# Patient Record
Sex: FEMALE | Race: WHITE
[De-identification: names, ages, dates, MRNs, and addresses within clinical notes are randomized per-mention and may not be internally consistent; named-entity substitution may affect disease eponyms.]

---

## 2020-02-24 ENCOUNTER — HOSPITAL ENCOUNTER (OUTPATIENT)
Dept: HOSPITAL 92 - SCSMRI | Age: 40
Discharge: HOME | End: 2020-02-24
Attending: FAMILY MEDICINE
Payer: COMMERCIAL

## 2020-02-24 DIAGNOSIS — M51.27: ICD-10-CM

## 2020-02-24 DIAGNOSIS — M48.07: ICD-10-CM

## 2020-02-24 DIAGNOSIS — G54.8: ICD-10-CM

## 2020-02-24 DIAGNOSIS — M48.061: ICD-10-CM

## 2020-02-24 DIAGNOSIS — R19.00: ICD-10-CM

## 2020-02-24 DIAGNOSIS — M51.26: ICD-10-CM

## 2020-02-24 DIAGNOSIS — M54.5: Primary | ICD-10-CM

## 2020-02-24 PROCEDURE — 72158 MRI LUMBAR SPINE W/O & W/DYE: CPT

## 2020-02-24 NOTE — MRI
MR the lumbar spine with and without contrast



INDICATION: Chronic low back pain



COMPARISON: None.



TECHNIQUE: Multiplanar multisequence MR images were obtained of lumbar spine with and without IV cont
rast.



Contrast: 17 cc of MultiHance.



FINDINGS:



Bone marrow: Normal.





Distal spinal cord and conus: Normal.  Conus is seen to terminate at the L1 level.



Visualized retroperitoneum and paraspinal soft tissues: Normal. No lymphadenopathy demonstrated.



Vertebral levels:



L5-S1: There is a large right paracentral cephalad extending disc extrusion measuring 1.8 x 1.7 cm in
 its greatest craniocaudad and mediolateral dimensions causing severe narrowing of the right

lateral recess with contact and slight posterior displacement of the traversing right S1 nerve root. 
This is superimposed on a broad-based disc bulge which along with loss of disc space height and

facet hypertrophy induces mild right neural foraminal narrowing.



L4-5: Broad-based disc bulge with a small superimposed central disc protrusion causes mild ventral ef
facement of the subarachnoid space and mild narrowing of the lateral recess bilaterally. The facet

hypertrophy and along with the disc degenerative disease induces mild left neural foraminal narrowing
.



L3-4: No appreciable central canal or neuroforaminal narrowing.



L2-3: No appreciable central canal or neuroforaminal narrowing.



L1-L2: No appreciable central canal or neuroforaminal narrowing.



T12-L1: No appreciable central canal or neuroforaminal narrowing.



Postcontrast series: No abnormal enhancement demonstrated.





IMPRESSION:

1. Large right paracentral, cephalad extending, disc extrusion at L5-S1 inducing severe narrowing of 
the right lateral recess with contact and posterior displacement of the traversing right S1 nerve

root.

2. Small superimposed central disc protrusion and broad-based disc bulge at L4-5 inducing mild latera
l recess narrowing.

3. Mild right L5-S1 and mild left L4-5 neural foraminal narrowing.



Reported By: Yovani Noguera 

Electronically Signed:  2/24/2020 3:24 PM

## 2020-03-03 ENCOUNTER — HOSPITAL ENCOUNTER (OUTPATIENT)
Dept: HOSPITAL 92 - LABBT | Age: 40
Discharge: HOME | End: 2020-03-03
Attending: SPECIALIST
Payer: COMMERCIAL

## 2020-03-03 DIAGNOSIS — D21.9: ICD-10-CM

## 2020-03-03 DIAGNOSIS — Z01.812: Primary | ICD-10-CM

## 2020-03-03 LAB
ANION GAP SERPL CALC-SCNC: 11 MMOL/L (ref 10–20)
BASOPHILS # BLD AUTO: 0 THOU/UL (ref 0–0.2)
BASOPHILS NFR BLD AUTO: 0.3 % (ref 0–1)
BUN SERPL-MCNC: 12 MG/DL (ref 7–18.7)
CALCIUM SERPL-MCNC: 9.1 MG/DL (ref 7.8–10.44)
CHLORIDE SERPL-SCNC: 106 MMOL/L (ref 98–107)
CO2 SERPL-SCNC: 26 MMOL/L (ref 22–29)
CREAT CL PREDICTED SERPL C-G-VRATE: 0 ML/MIN (ref 70–130)
EOSINOPHIL # BLD AUTO: 0.1 THOU/UL (ref 0–0.7)
EOSINOPHIL NFR BLD AUTO: 1.5 % (ref 0–10)
GLUCOSE SERPL-MCNC: 86 MG/DL (ref 70–105)
HGB BLD-MCNC: 13.4 G/DL (ref 12–16)
LYMPHOCYTES # BLD: 2.7 THOU/UL (ref 1.2–3.4)
LYMPHOCYTES NFR BLD AUTO: 40.7 % (ref 21–51)
MCH RBC QN AUTO: 29.6 PG (ref 27–31)
MCV RBC AUTO: 85.4 FL (ref 78–98)
MONOCYTES # BLD AUTO: 0.4 THOU/UL (ref 0.11–0.59)
MONOCYTES NFR BLD AUTO: 6.7 % (ref 0–10)
NEUTROPHILS # BLD AUTO: 3.3 THOU/UL (ref 1.4–6.5)
NEUTROPHILS NFR BLD AUTO: 50.8 % (ref 42–75)
PLATELET # BLD AUTO: 286 THOU/UL (ref 130–400)
POTASSIUM SERPL-SCNC: 3.9 MMOL/L (ref 3.5–5.1)
PREGS CONTROL BACKGROUND?: (no result)
PREGS CONTROL BAR APPEAR?: YES
RBC # BLD AUTO: 4.52 MILL/UL (ref 4.2–5.4)
SODIUM SERPL-SCNC: 139 MMOL/L (ref 136–145)
WBC # BLD AUTO: 6.6 THOU/UL (ref 4.8–10.8)

## 2020-03-03 PROCEDURE — 80048 BASIC METABOLIC PNL TOTAL CA: CPT

## 2020-03-03 PROCEDURE — 85025 COMPLETE CBC W/AUTO DIFF WBC: CPT

## 2020-03-03 PROCEDURE — 84703 CHORIONIC GONADOTROPIN ASSAY: CPT

## 2020-03-05 ENCOUNTER — HOSPITAL ENCOUNTER (OUTPATIENT)
Dept: HOSPITAL 92 - SDC | Age: 40
Discharge: HOME | End: 2020-03-05
Attending: SPECIALIST
Payer: COMMERCIAL

## 2020-03-05 VITALS — BODY MASS INDEX: 25 KG/M2

## 2020-03-05 DIAGNOSIS — Z88.2: ICD-10-CM

## 2020-03-05 DIAGNOSIS — D17.1: Primary | ICD-10-CM

## 2020-03-05 PROCEDURE — 0KBG0ZZ EXCISION OF LEFT TRUNK MUSCLE, OPEN APPROACH: ICD-10-PCS | Performed by: SPECIALIST

## 2020-03-05 PROCEDURE — 88304 TISSUE EXAM BY PATHOLOGIST: CPT

## 2020-03-05 PROCEDURE — S0020 INJECTION, BUPIVICAINE HYDRO: HCPCS

## 2020-03-06 NOTE — OP
DATE OF PROCEDURE:  03/05/2020



PREOPERATIVE DIAGNOSIS:  6 cm left flank intramuscular lipoma.



POSTOPERATIVE DIAGNOSIS:  6 cm left flank intramuscular lipoma.



PROCEDURE PERFORMED:  Excision of a 6 cm left flank submuscular lipoma.



ANESTHESIA:  General endotracheal.



INDICATIONS FOR PROCEDURE:  The patient is a 39-year-old white female.  She presents

with a persistent and bothersome visible and palpable mass on the left flank.

Ultrasound shows that this appears to be below the level of the superficial muscular

layer and she is taken to the operating room at this time for excision. 



DESCRIPTION OF PROCEDURE:  Informed consent was obtained.  The patient was taken to

the operating room, where general endotracheal anesthesia was obtained with the

patient in supine position.  She was rolled over to right lateral decubitus position

using a bean bag device.  The table was flexed to give optimal exposure of this

mass.  The patient was prepped with ChloraPrep and draped in sterile fashion.  I

utilized the ultrasound to definitively identify the location of the mass and marked

its location on the skin. 



Local anesthetic was infiltrated using a mixture of 0.25% Marcaine and 1% lidocaine

with epinephrine.  Skin incision was created.  Dissection was carried through skin

and subcutaneous tissue.  The superficial muscle was identified and incised.  Just

below this was an easily visible and palpable mass consistent with a lipoma.  This

was carefully dissected circumferentially and removed intact. 



Meticulous hemostasis was obtained with electrocautery.  Additional local anesthetic

was infiltrated.  The wound was then closed in layers using interrupted sutures of

3-0 

Vicryl to close the dead space and another of running suture of 3-0 Monocryl.

Finally, the skin edges were approximated with a running subcuticular suture of 4-0

Monocryl. 

Dermabond was placed externally.  There were no complications.  The patient

tolerated the procedure well and was taken to recovery room in stable condition. 







Job ID:  [id number]

## 2025-01-31 ENCOUNTER — HOSPITAL ENCOUNTER (OUTPATIENT)
Dept: HOSPITAL 92 - BICRAD | Age: 45
Discharge: HOME | End: 2025-01-31
Attending: FAMILY MEDICINE
Payer: COMMERCIAL

## 2025-01-31 DIAGNOSIS — R05.9: Primary | ICD-10-CM

## 2025-01-31 PROCEDURE — 71046 X-RAY EXAM CHEST 2 VIEWS: CPT
